# Patient Record
Sex: FEMALE | Race: WHITE | NOT HISPANIC OR LATINO
[De-identification: names, ages, dates, MRNs, and addresses within clinical notes are randomized per-mention and may not be internally consistent; named-entity substitution may affect disease eponyms.]

---

## 2023-04-13 ENCOUNTER — APPOINTMENT (OUTPATIENT)
Dept: AFTER HOURS CARE | Facility: EMERGENCY ROOM | Age: 28
End: 2023-04-13
Payer: MEDICARE

## 2023-04-13 ENCOUNTER — TRANSCRIPTION ENCOUNTER (OUTPATIENT)
Age: 28
End: 2023-04-13

## 2023-04-13 DIAGNOSIS — R06.02 SHORTNESS OF BREATH: ICD-10-CM

## 2023-04-13 DIAGNOSIS — G90.A POSTURAL ORTHOSTATIC TACHYCARDIA SYNDROME [POTS]: ICD-10-CM

## 2023-04-13 DIAGNOSIS — R00.2 PALPITATIONS: ICD-10-CM

## 2023-04-13 DIAGNOSIS — R53.83 OTHER FATIGUE: ICD-10-CM

## 2023-04-13 PROBLEM — Z00.00 ENCOUNTER FOR PREVENTIVE HEALTH EXAMINATION: Status: ACTIVE | Noted: 2023-04-13

## 2023-04-13 PROCEDURE — 99203 OFFICE O/P NEW LOW 30 MIN: CPT | Mod: 95

## 2023-04-13 NOTE — ASSESSMENT
[FreeTextEntry1] : Patient presents with multiple symptoms as above.  Multiple diagnoses were considered.  Her symptoms do not sound very acute, but rather subacute to chronic.  She would benefit from some screening labs to assess for etiology such as anemia, electrolyte imbalance, thyroid disease, diabetes, indolent infection.  As per patient request, we will also check for Lyme and mono.\par \par Patient agreed with plan to check the above, and is okay with going to closest [facility] facility to get labs done, as well as the closest radiology facility to get a chest x-ray.\par \par I advised patient that the best way to interpret these labs and to get follow-up would be to talk to her primary doctor about the results, but also we are available to discuss any abnormal findings and make any referrals to any specialists based on her lab findings.\par \par Patient knows to go to the closest urgent care or ER if her symptoms suddenly worsen.

## 2023-04-13 NOTE — PHYSICAL EXAM
[No Acute Distress] : no acute distress [Well Nourished] : well nourished [Well Developed] : well developed [Well-Appearing] : well-appearing [Supple] : supple [No Respiratory Distress] : no respiratory distress  [No Accessory Muscle Use] : no accessory muscle use [Normal Affect] : the affect was normal [Alert and Oriented x3] : oriented to person, place, and time

## 2023-04-13 NOTE — HISTORY OF PRESENT ILLNESS
[Home] : at home, [unfilled] , at the time of the visit. [Other Location: e.g. Home (Enter Location, City,State)___] : at [unfilled] [Verbal consent obtained from patient] : the patient, [unfilled] [FreeTextEntry8] : Patient presents for evaluation of multiple symptoms.  Patient states she has had ongoing variety of symptoms including fatigue, shortness of breath, muscle aches, nasal congestion, episodes of dizziness, palpitations, more recently over the last few weeks a productive cough, feeling hot, intermittent diarrhea and vomiting.  Patient has been diagnosed with POTS in the past, and attributes some of the symptoms to that, however she says they have worsened and she is not sure if it is a flareup of POTS or something else.  She talked to her primary doctor, but for logistical reasons has been unable to see them recently and they recommended that she talk to us about getting more urgently evaluated.  She denies any documented fever, chest pain.\par \par Pt's medications currently:\par trazadone 100mg\par propranalol ER 80 BID\par propranalol 10 mg prn\par diazepam 5 mg prn\par duloxetine 30 am 60 pm\par ondansetron 8 mg prn nausea\par corlinore 5 BID \par lestura birth control\par vivance 75mg\par nurtec odt 75 migraines\par methocarbamol prn\par medical marijuana\par Vraylar\par excedrin\par dayquil/nyquil\par

## 2024-02-07 NOTE — PLAN
[FreeTextEntry1] : 1.  Please go to the closest [facility] lab facility to have blood tests drawn.  We are checking your blood counts for signs of anemia, infection, your electrolytes, screening for diseases such as diabetes and high cholesterol, Lyme disease, mononucleosis, HIV.\par \par 2.  Go to the closest [facility] radiology facility to get an x-ray of your chest done to assess for fluid or pneumonia.\par \par 3.  Once you get the results, please take them to your primary doctor's office to discuss the significance of any potential abnormalities and to come up a plan for the next step in assessing your health.\par \par 4.  If you are unable to talk to your primary doctor, or prefer to talk to us, you can call us back to discuss your abnormal lab findings.\par \par 5.  If you develop worsening symptoms, particularly difficulty breathing, chest pain, persistent fevers, inability to eat or drink due to persistent vomiting, severe pain, or any other concern, please go to the closest emergency department for immediate evaluation and treatment.
DISCHARGE

## 2024-02-12 ENCOUNTER — APPOINTMENT (OUTPATIENT)
Dept: AFTER HOURS CARE | Facility: EMERGENCY ROOM | Age: 29
End: 2024-02-12
Payer: MEDICARE

## 2024-02-12 DIAGNOSIS — J32.9 CHRONIC SINUSITIS, UNSPECIFIED: ICD-10-CM

## 2024-02-12 PROCEDURE — 99214 OFFICE O/P EST MOD 30 MIN: CPT

## 2024-02-12 RX ORDER — PREDNISONE 20 MG/1
20 TABLET ORAL DAILY
Qty: 6 | Refills: 0 | Status: ACTIVE | COMMUNITY
Start: 2024-02-12 | End: 1900-01-01

## 2024-02-12 NOTE — ASSESSMENT
[FreeTextEntry1] : Patient was stated past medical history as above now with 2 to 3 weeks of facial pain, congestion, headache consistent with sinusitis, still with nasal discharge that is purulent.  No new symptoms no vision changes, no vomiting or nausea, no clinical evidence of meningismus.  Already put on appropriate antibiotics and Flonase by urgent care today but given severity of symptoms may benefit from very short course of oral steroids.  Recommended NSAIDs as well, pseudoephedrine as needed.  Recommended close PCP follow-up very strict ED precautions.

## 2024-02-12 NOTE — PHYSICAL EXAM
[Normal Sclera/Conjunctiva] : normal sclera/conjunctiva [EOMI] : extraocular movements intact [de-identified] : PLEASE SEE HPI FOR ADDITIONAL RELEVANT PHYSICAL EXAM FINDINGS GENERAL: no acute distress, nontoxic HEAD: normocephalic EYES: no scleral icterus NECK: trachea midline, Full ROM RESP: no respiratory distress ABD: nondistended MSK: no gross deformity NEURO: alert & fully oriented SKIN: no rash PSYCH: cooperative, good insight, appropriate, fluent speech  [de-identified] : EOM painless

## 2024-02-12 NOTE — HISTORY OF PRESENT ILLNESS
[Home] : at home, [unfilled] , at the time of the visit. [Other Location: e.g. Home (Enter Location, City,State)___] : at [unfilled] [Verbal consent obtained from patient] : the patient, [unfilled] [FreeTextEntry8] : 28 y F 2-3 wk sinus pressure, facial pain, green nasal drainage, and congestion. No f/c, vision changes, n/v/d, neck pain, SOB Taking mucinex-D without improvement Rx'd cefdinir and flonase today at [facility] -- POTS Meds -- amitryptiline, ondansetron, cariprazine, duloxatine, vestura, robaxin, propranolol, singular, vyvanse, migranol, tulipta, tizanidine NKDA LMP several weeks ago

## 2024-05-27 ENCOUNTER — NON-APPOINTMENT (OUTPATIENT)
Age: 29
End: 2024-05-27